# Patient Record
Sex: FEMALE | Race: WHITE | NOT HISPANIC OR LATINO | Employment: FULL TIME
[De-identification: names, ages, dates, MRNs, and addresses within clinical notes are randomized per-mention and may not be internally consistent; named-entity substitution may affect disease eponyms.]

---

## 2017-06-29 ENCOUNTER — RECORDS - HEALTHEAST (OUTPATIENT)
Dept: ADMINISTRATIVE | Facility: OTHER | Age: 22
End: 2017-06-29

## 2017-07-31 ENCOUNTER — RECORDS - HEALTHEAST (OUTPATIENT)
Dept: ADMINISTRATIVE | Facility: OTHER | Age: 22
End: 2017-07-31

## 2017-09-01 ENCOUNTER — RECORDS - HEALTHEAST (OUTPATIENT)
Dept: ADMINISTRATIVE | Facility: OTHER | Age: 22
End: 2017-09-01

## 2017-09-07 ENCOUNTER — TRANSFERRED RECORDS (OUTPATIENT)
Dept: HEALTH INFORMATION MANAGEMENT | Facility: CLINIC | Age: 22
End: 2017-09-07

## 2018-10-01 ENCOUNTER — TRANSFERRED RECORDS (OUTPATIENT)
Dept: HEALTH INFORMATION MANAGEMENT | Facility: CLINIC | Age: 23
End: 2018-10-01

## 2018-11-14 ENCOUNTER — OFFICE VISIT (OUTPATIENT)
Dept: DERMATOLOGY | Facility: CLINIC | Age: 23
End: 2018-11-14
Payer: COMMERCIAL

## 2018-11-14 DIAGNOSIS — L70.0 ACNE VULGARIS: ICD-10-CM

## 2018-11-14 DIAGNOSIS — L20.84 INTRINSIC ECZEMA: Primary | ICD-10-CM

## 2018-11-14 RX ORDER — TRIAMCINOLONE ACETONIDE 1 MG/G
CREAM TOPICAL 2 TIMES DAILY
Qty: 80 G | Refills: 1 | Status: SHIPPED | OUTPATIENT
Start: 2018-11-14 | End: 2020-07-15

## 2018-11-14 RX ORDER — ADAPALENE GEL USP, 0.3% 3 MG/G
GEL TOPICAL AT BEDTIME
COMMUNITY
End: 2020-07-15

## 2018-11-14 ASSESSMENT — PAIN SCALES - GENERAL: PAINLEVEL: NO PAIN (0)

## 2018-11-14 NOTE — LETTER
11/14/2018       RE: Reanna Diallo  [address] [place] [address]     Dear Colleague,    Thank you for referring your patient, Reanna Diallo, to the [facility] DERMATOLOGY at [facility]. Please see a copy of my visit note below.    [facility] Dermatology Note      Dermatology Problem List:  1. Acne vulgaris   - spironolactone 50 mg BID   - s/p adapalene 0.3% gel, doxycycline, spironolactone, Accutane   2. Eczematous dermatitis, neck and left axilla   - triamcinolone 0.1% ointment  3. Hx folliculitis, legs      Encounter Date: Nov 14, 2018    CC:  Chief Complaint   Patient presents with     Derm Problem     Reanna would like to discuss acne treatment .     Derm Problem     Reanna notes she has developed rashes after being accutane.      History of Present Illness:  Ms. Reanna Diallo is a 23 year old female who presents today in self referral for an acne and dermatitis evaluation as a new patient. There is no family history of acne, skin cancer or skin diseases. She admits to frequent sun exposure over her lifetime, as she enjoys the outdoors.     Today she reports that her acne is currently out of control. She has struggled with controlling her acne since she was in high school. She has tried multiple different topicals, doxycyline, spironolactone and one round of Accutane without much improvement. She was very unhappy with her course of Accutane in September 2017. She reports that she started experiencing rashes on her legs and stomach upset at the high dose of Accutane. Since she completed her course of Accutane, she noticed her acne coming back. She recently switched birth controls this past July to her third variety of OCP. She feels like her acne has worsened since her most recent change in birth controls. She is also unhappy with her acne scarring. About a month ago she started spironolactone and adapalene 0.3% gel.     The rashes on her  legs are still present. She finds that her legs are now so sensitive that it affects the types of pants she can wear. She describes the rash on her neck, arms and legs as small red dots. Her most recent flare of her rash was about a week ago while she wore a certain pair of jeans while she was on vacation. This rash has since resolved itself. About a year ago she was prescribed an oral antibiotic for this rash, which she was told was folliculitis. here is a different type of rash on her neck and arm pits than on her legs. She denies involvement of her chest and torso. She uses dove bar soap. She uses baby sensitive-skin laundry detergent.     She admits to a history of irregular periods piror to starting birth control. She denies excess facial hair. Otherwise the patient reports no additional painful, bleeding, nonhealing or pruritic lesions and denies any new or changing moles.     Past Medical History:   There is no problem list on file for this patient.    History reviewed. No pertinent past medical history.  History reviewed. No pertinent surgical history.    Social History:  Patient  reports that she has never smoked. She has never used smokeless tobacco.   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 80 g 1       Allergies   Allergen Reactions     Amoxicillin Hives     Penicillin G Hives       Review of Systems:  -Constitutional: Otherwise feeling well today, in usual state of  health.  -HEENT: Patient denies nonhealing oral sores.  -Skin: As above in HPI. No additional skin concerns.    Physical exam:  Vitals: There were no vitals taken for this visit.  GEN: This is a well developed, well-nourished female in no acute distress, in a pleasant mood.    SKIN: A focused examination of the face,neck, axillae and lower extremities. Significant for:     - Scattered inflammatory papules to lower face and lateral cheeks  - hyperemic papules noted and acne scarring noted to the lower face.   - No active eruption on legs  - pink eczematous plaques on anterior neck and left lateral chest   -No other lesions of concern on areas examined.       Impression/Plan:  1. Acne vulgaris, recalcitrant to topicals and oral antibiotics per pt history     Discussed birth controls and their relationship with acne. Recommend follow up with OB GYN regarding a vaginal ultrasound for PCOS    Continue spironolactone 100 mg daily. Reminded of side effects of spironolactone discussed including postural hypotension, increased frequency of urination, breast tenderness, menstrual spotting, cardiac arrythmias     Continue Adapalene 0.3% gel at bedtime.   2. Eczematous dermatitis  - Scrapings were obtained from the neck and left arm pit. Negative for fungal elements.     Recommend using triamcinolone 0.1% cream bid on these areas if symptomatic , up to 2 weeks.    Recommend diligent use of moisturizers     3. Hx folliculitis on legs per pt photos/history      No active eruption upon physical examination today. Return to clinic for culture if symptoms return    Recommend starting a BPO wash 2-3 times weekly while showering if lesions return    Follow-up in 3 months, earlier for new or changing lesions.     Staff Involved:  Scribe/Staff    Scribe Disclosure:   Alisha DOMINIQUE, am serving as a scribe to document services personally performed by Padmaja Acuna PA-C, based on data collection and the provider's statements to  me.    Provider Disclosure:   The documentation recorded by the scribe accurately reflects the services I personally performed and the decisions made by me.    All risks, benefits and alternatives were discussed with patient.  Patient is in agreement and understands the assessment and plan.  All questions were answered.  Sun Screen Education was given.   Return to Clinic in 3 months or sooner as needed.   Padmaja Acuna PA-C   [facility]       Again, thank you for allowing me to participate in the care of your patient.      Sincerely,    Padmaja Acuna PA-C

## 2018-11-14 NOTE — PROGRESS NOTES
[facility] Dermatology Note      Dermatology Problem List:  1. Acne vulgaris   - spironolactone 50 mg BID   - s/p adapalene 0.3% gel, doxycycline, spironolactone, Accutane   2. Eczematous dermatitis, neck and left axilla   - triamcinolone 0.1% ointment  3. Hx folliculitis, legs      Encounter Date: Nov 14, 2018    CC:  Chief Complaint   Patient presents with     Derm Problem     Reanna would like to discuss acne treatment .     Derm Problem     Reanna notes she has developed rashes after being accutane.      History of Present Illness:  Ms. Reanna Diallo is a 23 year old female who presents today in self referral for an acne and dermatitis evaluation as a new patient. There is no family history of acne, skin cancer or skin diseases. She admits to frequent sun exposure over her lifetime, as she enjoys the outdoors.     Today she reports that her acne is currently out of control. She has struggled with controlling her acne since she was in high school. She has tried multiple different topicals, doxycyline, spironolactone and one round of Accutane without much improvement. She was very unhappy with her course of Accutane in September 2017. She reports that she started experiencing rashes on her legs and stomach upset at the high dose of Accutane. Since she completed her course of Accutane, she noticed her acne coming back. She recently switched birth controls this past July to her third variety of OCP. She feels like her acne has worsened since her most recent change in birth controls. She is also unhappy with her acne scarring. About a month ago she started spironolactone and adapalene 0.3% gel.     The rashes on her legs are still present. She finds that her legs are now so sensitive that it affects the types of pants she can wear. She describes the rash on her neck, arms and legs as small red dots. Her most recent flare of her rash was about a week ago while she wore a certain pair of jeans  while she was on vacation. This rash has since resolved itself. About a year ago she was prescribed an oral antibiotic for this rash, which she was told was folliculitis. here is a different type of rash on her neck and arm pits than on her legs. She denies involvement of her chest and torso. She uses dove bar soap. She uses baby sensitive-skin laundry detergent.     She admits to a history of irregular periods piror to starting birth control. She denies excess facial hair. Otherwise the patient reports no additional painful, bleeding, nonhealing or pruritic lesions and denies any new or changing moles.     Past Medical History:   There is no problem list on file for this patient.    History reviewed. No pertinent past medical history.  History reviewed. No pertinent surgical history.    Social History:  Patient  reports that she has never smoked. She has never used smokeless tobacco.   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 80 g 1       Allergies   Allergen Reactions     Amoxicillin Hives     Penicillin G Hives       Review of Systems:  -Constitutional: Otherwise feeling well today, in usual state of health.  -HEENT: Patient denies nonhealing oral sores.  -Skin: As above in HPI. No additional skin concerns.    Physical exam:  Vitals: There were no vitals taken for this visit.  GEN: This is a well developed, well-nourished female in no acute distress, in a pleasant mood.     SKIN: A focused examination of the face,neck, axillae and lower extremities. Significant for:     - Scattered inflammatory papules to lower face and lateral cheeks  - hyperemic papules noted and acne scarring noted to the lower face.   - No active eruption on legs  - pink eczematous plaques on anterior neck and left lateral chest   -No other lesions of concern on areas examined.       Impression/Plan:  1. Acne vulgaris, recalcitrant to topicals and oral antibiotics per pt history     Discussed birth controls and their relationship with acne. Recommend follow up with OB GYN regarding a vaginal ultrasound for PCOS    Continue spironolactone 100 mg daily. Reminded of side effects of spironolactone discussed including postural hypotension, increased frequency of urination, breast tenderness, menstrual spotting, cardiac arrythmias     Continue Adapalene 0.3% gel at bedtime.   2. Eczematous dermatitis  - Scrapings were obtained from the neck and left arm pit. Negative for fungal elements.     Recommend using triamcinolone 0.1% cream bid on these areas if symptomatic , up to 2 weeks.    Recommend diligent use of moisturizers     3. Hx folliculitis on legs per pt photos/history      No active eruption upon physical examination today. Return to clinic for culture if symptoms return    Recommend starting a BPO wash 2-3 times weekly while showering if lesions return    Follow-up in 3 months, earlier for new or changing lesions.     Staff Involved:  Scribe/Staff    Scribe Disclosure:   TRIP, Alisha Anderson, am serving as a scribe to document services personally performed by Padmaja Acuna PA-C, based on data collection and the provider's statements to me.    Provider Disclosure:   The documentation recorded by the scribe accurately reflects the services I personally performed and the decisions made by me.    All risks, benefits and alternatives were discussed with patient.  Patient is in agreement and understands the assessment  and plan.  All questions were answered.  Sun Screen Education was given.   Return to Clinic in 3 months or sooner as needed.   Padmaja Acuna PA-C   [facility]

## 2018-11-14 NOTE — NURSING NOTE
Dermatology Rooming Note    Reanna Diallo's goals for this visit include:   Chief Complaint   Patient presents with     Derm Problem     Reanna would like to discuss acne treatment .     Derm Problem     Reanna notes she has developed rashes after being accutane.      Grace Jack LPN

## 2018-11-14 NOTE — LETTER
Date:November 15, 2018      Patient was self referred, no letter generated. Do not send.        [facility] Health Information

## 2018-11-14 NOTE — MR AVS SNAPSHOT
After Visit Summary   2018    Reanna Diallo    MRN: [id number]           Patient Information     Date Of Birth          1995        Visit Information        Provider Department      2018 3:30 PM Padmaja Acuna PA-C [facility] Dermatology        Today's Diagnoses     Intrinsic eczema    -  1    Acne vulgaris           Follow-ups after your visit        Follow-up notes from your care team     Return in about 3 months (around 2019).      Your next 10 appointments already scheduled     2019  6:15 PM CST   (Arrive by 6:00 PM)   Return Visit with BEENA Campos [facility] Dermat[facility] ([facility])    [address] [place] [id number] [phone]              Who to contact     Please call your clinic at [phone] to:    Ask questions about your health    Make or cancel appointments    Discuss your medicines    Learn about your test results    Speak to your doctor            Additional Information About Your Visit        MyChart Information     [employer] is an electronic gateway that provides easy, online access to your medical records. With [employer], you can request a clinic appointment, read your test results, renew a prescription or communicate with your care team.     To sign up for [employer] visit the website at [email or web address]   You will be asked to enter the access code listed below, as well as some personal information. Please follow the directions to create your username and password.     Your access code is: [email or web address]  Expires: 2019  6:31 AM     Your access code will  in 90 days. If you need help or a new code, please contact your [facility] or call [phone] for assistance.        Care EveryWhere ID     This is your Care EveryWhere ID. This could be used by other organizations to access your [place] medical records  [id number]          Blood Pressure from Last 3 Encounters:   No data found for BP    Weight from Last 3 Encounters:   No data found for Wt              We Performed the Following     Koh prep (Back Office)          Today's Medication Changes          These changes are accurate as of 11/14/18  8:32 PM.  If you have any questions, ask your nurse or doctor.               Start taking these medicines.        Dose/Directions    triamcinolone 0.1 % cream   Commonly known as:  KENALOG   Used for:  Intrinsic eczema   Started by:  Padmaja Acuna PA-C        Apply topically 2 times daily Up to 2 weeks to affected areas on the neck and lateral chest/ axilla.   Quantity:  80 g   Refills:  1            Where to get your medicines      These medications were sent to [id number] [place], [place] - [address]  [address] [facility] [address]     Phone:  [phone]     triamcinolone 0.1 % cream                Primary Care Provider    None Specified       No primary provider on file.        Equal Access to Services     [facility]: Hadii mohit Stoll, waaxparker mojica, qaybta kaalmada benja, bijal sanz . So [facility] [phone].    ATENCIÓN: Si habla español, tiene a israel disposición servicios gratuitos de asistencia lingüística. Llame al [phone].    We comply with applicable federal civil rights laws and [place] laws. We do not discriminate on the basis of race, color, national origin, age, disability, sex, sexual orientation, or gender identity.            Thank you!     Thank you for choosing [facility] DERMATOLOGY  for your care. Our goal is always to provide you with excellent care. Hearing back from our patients is one way we can continue to improve our services. Please take a few minutes to complete the written survey that you may receive in the mail after your visit with us. Thank you!             Your Updated Medication List - Protect others around you: Learn how to  safely use, store and throw away your medicines at www.disposemymeds.org.          This list is accurate as of 11/14/18  8:32 PM.  Always use your most recent med list.                   Brand Name Dispense Instructions for use Diagnosis    adapalene 0.3 % gel      Apply topically At Bedtime        norgestrel-ethinyl estradiol 0.3-30 MG-MCG per tablet    LO/OVRAL     Take 1 tablet by mouth daily        SPIRONOLACTONE PO      Take 100 mg by mouth        triamcinolone 0.1 % cream    KENALOG    80 g    Apply topically 2 times daily Up to 2 weeks to affected areas on the neck and lateral chest/ axilla.    Intrinsic eczema

## 2019-02-13 ENCOUNTER — OFFICE VISIT (OUTPATIENT)
Dept: DERMATOLOGY | Facility: CLINIC | Age: 24
End: 2019-02-13
Payer: COMMERCIAL

## 2019-02-13 VITALS — SYSTOLIC BLOOD PRESSURE: 113 MMHG | HEART RATE: 78 BPM | DIASTOLIC BLOOD PRESSURE: 70 MMHG

## 2019-02-13 DIAGNOSIS — L70.0 ACNE VULGARIS: Primary | ICD-10-CM

## 2019-02-13 RX ORDER — CLINDAMYCIN PHOSPHATE 10 MG/G
GEL TOPICAL DAILY
Qty: 60 G | Refills: 11 | Status: SHIPPED | OUTPATIENT
Start: 2019-02-13 | End: 2020-07-15

## 2019-02-13 RX ORDER — SPIRONOLACTONE 50 MG/1
150 TABLET, FILM COATED ORAL DAILY
Qty: 90 TABLET | Refills: 2 | Status: SHIPPED | OUTPATIENT
Start: 2019-02-13 | End: 2019-05-22

## 2019-02-13 ASSESSMENT — PAIN SCALES - GENERAL: PAINLEVEL: NO PAIN (0)

## 2019-02-13 NOTE — PROGRESS NOTES
[facility] Dermatology Note      Dermatology Problem List:  1. Acne vulgaris   - spironolactone 50 mg TID, clindamycin 1% gel  - s/p adapalene 0.3% gel, doxycycline, spironolactone, Accutane   2. Eczematous dermatitis, neck and left axilla   - triamcinolone 0.1% ointment  3. Hx folliculitis, legs      Encounter Date: Feb 13, 2019    CC:  Chief Complaint   Patient presents with     Acne     Reanna is here today for a 3 month follow up acne follow up. No change      History of Present Illness:  Ms. Reanna Diallo is a 23 year old female who presents today in follow up for acne. The patient was last seen in the dermatology clinic on 11/14/18 during which she was recommended BPO wash for her folliculitis.     Today she reports her skin is stable and has not changed recently. She feels her skin has plateaued. She has been taking her spironolactone daily, but has reduced use of her topicals as they are too irritating for her skin. Her acne is still isolated to her face. She notes that every month around her period she gets deep, painful cystic papules.     Otherwise she is feeling well, without additional skin concerns. Denies menstrual irregularities, excessive urination or dizziness. She is still using OCP for birth control.    Past Medical History:   There is no problem list on file for this patient.    No past medical history on file.  No past surgical history on file.    Social History:  Patient  reports that  has never smoked. she has never used smokeless tobacco.   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 80 g 1       Allergies   Allergen Reactions     Amoxicillin Hives     Penicillin G Hives       Review of Systems:  -Constitutional: Otherwise feeling well today, in usual state of health.  -HEENT: Patient denies nonhealing oral sores.  -Skin: As above in HPI. No additional skin concerns.    Physical exam:  Vitals: There were no vitals taken for this visit.  GEN: This is a well developed, well-nourished female in no acute distress, in a pleasant mood.    SKIN: A focused examination of the face,neck, axillae and lower extremities. Significant for:     - Scattered inflammatory papules to lower face and lateral cheeks  - hyperemic papules noted and acne scarring noted to the lower face.   -No other lesions of concern on areas examined.       Impression/Plan:  1. Acne vulgaris, recalcitrant to topicals and oral antibiotics per pt history (doxycyline, topical sulfacetamide wash, topical erythromycin and tretinoin)    Discussed risks and benefits of Accutane at length. Pt wishes to wait on initiating a second course of Accutane. Will consider at follow up visit.     Increase dose of spironolactone to 150 mg daily. Reminded of side effects of spironolactone discussed including postural hypotension, increased frequency of urination, breast tenderness, menstrual spotting, cardiac arrythmias     Restart Adapalene 0.3% gel every 2-3rd night. Discussed increasing use to every other night or daily as tolerated.    Start clindamycin 1% gel, apply daily to face     Photodocumentation obtained today     Follow-up in 3 months, earlier for new or changing lesions.     Staff Involved:  Scribe/Staff    Scribe Disclosure:   Alisha DOMINIQUE, am serving as a scribe to document services personally performed by Padmaja Acuna PA-C, based on data collection and the provider's statements to  me.    Provider Disclosure:   The documentation recorded by the scribe accurately reflects the services I personally performed and the decisions made by me.    All risks, benefits and alternatives were discussed with patient.  Patient is in agreement and understands the assessment and plan.  All questions were answered.  Sun Screen Education was given.   Return to Clinic in 3 months or sooner as needed.   Padmaja Acuna PA-C   [facility]

## 2019-02-13 NOTE — LETTER
Date:February 14, 2019      Patient was self referred, no letter generated. Do not send.        [facility] Health Information

## 2019-02-13 NOTE — NURSING NOTE
Dermatology Rooming Note    Reanna Diallo's goals for this visit include:   Chief Complaint   Patient presents with     Acne     Reanna is here today for a 3 month follow up acne follow up. No change      HOMER Dickens

## 2019-02-13 NOTE — LETTER
2/13/2019       RE: Renana Diallo  [address] [place] [address]     Dear Colleague,    Thank you for referring your patient, Reanna Diallo, to the [facility] DERMATOLOGY at [facility]. Please see a copy of my visit note below.    [facility] Dermatology Note      Dermatology Problem List:  1. Acne vulgaris   - spironolactone 50 mg TID, clindamycin 1% gel  - s/p adapalene 0.3% gel, doxycycline, spironolactone, Accutane   2. Eczematous dermatitis, neck and left axilla   - triamcinolone 0.1% ointment  3. Hx folliculitis, legs      Encounter Date: Feb 13, 2019    CC:  Chief Complaint   Patient presents with     Acne     Reanna is here today for a 3 month follow up acne follow up. No change      History of Present Illness:  Ms. Reanna Diallo is a 23 year old female who presents today in follow up for acne. The patient was last seen in the dermatology clinic on 11/14/18 during which she was recommended BPO wash for her folliculitis.     Today she reports her skin is stable and has not changed recently. She feels her skin has plateaued. She has been taking her spironolactone daily, but has reduced use of her topicals as they are too irritating for her skin. Her acne is still isolated to her face. She notes that every month around her period she gets deep, painful cystic papules.     Otherwise she is feeling well, without additional skin concerns. Denies menstrual irregularities, excessive urination or dizziness. She is still using OCP for birth control.    Past Medical History:   There is no problem list on file for this patient.    No past medical history on file.  No past surgical history on file.    Social History:  Patient  reports that  has never smoked. she has never used smokeless tobacco.   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 80 g 1       Allergies   Allergen Reactions     Amoxicillin Hives     Penicillin G Hives       Review of Systems:  -Constitutional: Otherwise feeling well today, in usual state of health.  -HEENT: Patient denies nonhealing oral sores.  -Skin: As above in HPI. No additional skin concerns.    Physical exam:  Vitals: There were no vitals taken for this visit.  GEN: This is a well developed, well-nourished female in no acute distress, in a pleasant mood.    SKIN: A focused examination of the face,neck, axillae and lower extremities. Significant for:     - Scattered inflammatory papules to lower face and lateral cheeks  - hyperemic papules noted and acne scarring noted to the lower face.   -No other lesions of concern on areas examined.       Impression/Plan:  1. Acne vulgaris, recalcitrant to topicals and oral antibiotics per pt history (doxycyline, topical sulfacetamide wash, topical erythromycin and tretinoin)    Discussed risks and benefits of Accutane at length. Pt wishes to wait on initiating a second course of Accutane. Will consider at follow up visit.     Increase dose of spironolactone to 150 mg daily. Reminded of side effects of spironolactone discussed including postural hypotension, increased frequency of urination, breast tenderness, menstrual spotting, cardiac arrythmias     Restart Adapalene 0.3% gel every 2-3rd night. Discussed increasing use to every other night or daily as tolerated.    Start clindamycin 1% gel, apply daily to face     Photodocumentation obtained today      Follow-up in 3 months, earlier for new or changing lesions.     Staff Involved:  Scribe/Staff    Scribe Disclosure:   I, Alisha Anderson, am serving as a scribe to document services personally performed by Padmaja Acuna PA-C, based on data collection and the provider's statements to me.    Provider Disclosure:   The documentation recorded by the scribe accurately reflects the services I personally performed and the decisions made by me.    All risks, benefits and alternatives were discussed with patient.  Patient is in agreement and understands the assessment and plan.  All questions were answered.  Sun Screen Education was given.   Return to Clinic in 3 months or sooner as needed.   Padmaja Acuna PA-C   [facility]       Again, thank you for allowing me to participate in the care of your patient.      Sincerely,    Padmaja Acuna PA-C

## 2019-05-22 ENCOUNTER — OFFICE VISIT (OUTPATIENT)
Dept: DERMATOLOGY | Facility: CLINIC | Age: 24
End: 2019-05-22
Payer: COMMERCIAL

## 2019-05-22 VITALS — WEIGHT: 151 LBS | DIASTOLIC BLOOD PRESSURE: 71 MMHG | HEART RATE: 56 BPM | SYSTOLIC BLOOD PRESSURE: 128 MMHG

## 2019-05-22 DIAGNOSIS — L70.0 ACNE VULGARIS: Primary | ICD-10-CM

## 2019-05-22 DIAGNOSIS — L70.0 ACNE VULGARIS: ICD-10-CM

## 2019-05-22 LAB — HCG UR QL: NEGATIVE

## 2019-05-22 RX ORDER — SPIRONOLACTONE 50 MG/1
150 TABLET, FILM COATED ORAL DAILY
Qty: 90 TABLET | Refills: 1 | Status: SHIPPED | OUTPATIENT
Start: 2019-05-22 | End: 2020-07-15

## 2019-05-22 ASSESSMENT — PAIN SCALES - GENERAL: PAINLEVEL: NO PAIN (0)

## 2019-05-22 NOTE — NURSING NOTE
Dermatology Rooming Note    Reanna Diallo's goals for this visit include:   Chief Complaint   Patient presents with     Derm Problem     Acne, no improvement noted.     Grace Jack LPN

## 2019-05-22 NOTE — LETTER
5/22/2019       RE: Reanna Diallo  [address] [place] [address]     Dear Colleague,    Thank you for referring your patient, Reanna Diallo, to the [facility] DERMATOLOGY at [facility]. Please see a copy of my visit note below.    [facility] Dermatology Note      Dermatology Problem List:  1. Acne vulgaris   - spironolactone 150 mg daily, clindamycin 1% gel  - s/p adapalene 0.3% gel, doxycycline, spironolactone 100 mg daily, Accutane course   2. Eczematous dermatitis, neck and left axilla   - triamcinolone 0.1% ointment  3. Hx folliculitis, legs  - BPO wash    Encounter Date: May 22, 2019    CC:  Chief Complaint   Patient presents with     Derm Problem     Acne, no improvement noted.     History of Present Illness:  Ms. Reanna Diallo is a 23 year old female who presents today in follow up for acne. The patient was last seen in the dermatology clinic on  2/12/2019 during which her spironolactone was increased to 150 mg. She was restarted on her adapalene 0.3% gel nightly and was started on clindamycin 1% gel daily. She states her skin is slightly better but is still having breakouts.  Her acne is still isolated to her face. She notes that every month around her period she gets deep, painful cystic papules. She is interested in other options today, including Accutane.     Otherwise she is feeling well, without additional skin concerns. Denies menstrual irregularities, excessive urination or dizziness. She is still using OCP for birth control.    Past Medical History:   There is no problem list on file for this patient.    No past medical history on file.  No past surgical history on file.    Social History:  Patient  reports that she has never smoked. She has never used smokeless tobacco.   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[employer]    Family History:  Family History   Problem Relation Age of Onset     Skin Cancer No family hx of      Melanoma No family  hx of        Medications:  Current Outpatient Medications   Medication Sig Dispense Refill     adapalene 0.3 % gel Apply topically At Bedtime       clindamycin (CLINDAMAX) 1 % external gel Apply topically daily 60 g 11     norgestrel-ethinyl estradiol (LO/OVRAL) 0.3-30 MG-MCG per tablet Take 1 tablet by mouth daily       spironolactone (ALDACTONE) 50 MG tablet Take 3 tablets (150 mg) by mouth daily 90 tablet 1     triamcinolone (KENALOG) 0.1 % cream Apply topically 2 times daily Up to 2 weeks to affected areas on the neck and lateral chest/ axilla. (Patient not taking: Reported on 5/22/2019) 80 g 1       Allergies   Allergen Reactions     Amoxicillin Hives     Penicillin G Hives       Review of Systems:  -Constitutional: Otherwise feeling well today, in usual state of health.  -HEENT: Patient denies nonhealing oral sores.  -Skin: As above in HPI. No additional skin concerns.    Physical exam:  Vitals: /71   Pulse 56   Wt 68.5 kg (151 lb)   GEN: This is a well developed, well-nourished female in no acute distress, in a pleasant mood.    SKIN: Acne exam, which includes the face, neck, upper central chest, and upper central back was performed.   Significant for:   - Scattered inflammatory papules to lower face and lateral cheeks  - hyperemic papules noted and acne scarring noted to the lower face.   -No other lesions of concern on areas examined.       Impression/Plan:  1. Acne vulgaris, recalcitrant to topicals and oral antibiotics per pt history (doxycyline, topical sulfacetamide wash, topical erythromycin and tretinoin)    Discussed risks and benefits of Accutane at length. Will initiate Accutane process today and continue the oral and topical medications until next month.     Continue spironolactone to 150 mg daily. Reminded of side effects of spironolactone discussed including postural hypotension, increased frequency of urination, breast tenderness, menstrual spotting, cardiac arrythmias     Continue   Adapalene 0.3% gel every 2-3rd night. Discussed increasing use to every other night or daily as tolerated.    Continue clindamycin 1% gel, apply daily to face     Discussion of the risks and side effects of Accutane including but not limited to mucocutaneous dryness, arthralgias, myalgias, depression, suicidal ideation, headache, blurred vision,  increase in liver function tests, increase in lipids, and teratogenic effects. Discussed need for two forms of contraception. The ipledgeprogram brochure was provided and the contents discussed with the patient. The patient was counseled they cannot give blood while on Accutane. No personal or family history of inflammatory bowel disease or hypertriglyceridemia known to patient. Reviewed need to avoid alcohol on medication.   Ipledge program consent was obtained. Patient counseled that if they wear contacts eye may become too dry to tolerate. Recommend follow up with eye doctor.    At the next visit, we will begin Accutane 40 mg daily. Goal dose is 10,295 to 15,100 mg 150-220 mg/kg dosing in this 68 kg patient.    Baseline labs including CBC, BUN/Cr, fasting lipids and alt/ast will be obtained at next visit.     Baseline pregnancy test today. The patients two forms of contraception are OCP and male latex condoms.     Total cumulative dose 0 mg.   Patient's I-pledge # is [phone]  The patient will stop all acne medications   Follow-up in 1 months, earlier for new or changing lesions.     All risks, benefits and alternatives were discussed with patient.  Patient is in agreement and understands the assessment and plan.  All questions were answered.  Sun Screen Education was given.   Return to Clinic in 1 months or sooner as needed.   Padmaja Acuna PA-C   [facility]

## 2019-05-22 NOTE — PROGRESS NOTES
[facility] Dermatology Note      Dermatology Problem List:  1. Acne vulgaris   - spironolactone 150 mg daily, clindamycin 1% gel  - s/p adapalene 0.3% gel, doxycycline, spironolactone 100 mg daily, Accutane course   2. Eczematous dermatitis, neck and left axilla   - triamcinolone 0.1% ointment  3. Hx folliculitis, legs  - BPO wash    Encounter Date: May 22, 2019    CC:  Chief Complaint   Patient presents with     Derm Problem     Acne, no improvement noted.     History of Present Illness:  Ms. Reanna Diallo is a 23 year old female who presents today in follow up for acne. The patient was last seen in the dermatology clinic on  2/12/2019 during which her spironolactone was increased to 150 mg. She was restarted on her adapalene 0.3% gel nightly and was started on clindamycin 1% gel daily. She states her skin is slightly better but is still having breakouts.  Her acne is still isolated to her face. She notes that every month around her period she gets deep, painful cystic papules. She is interested in other options today, including Accutane.     Otherwise she is feeling well, without additional skin concerns. Denies menstrual irregularities, excessive urination or dizziness. She is still using OCP for birth control.    Past Medical History:   There is no problem list on file for this patient.    No past medical history on file.  No past surgical history on file.    Social History:  Patient  reports that she has never smoked. She has never used smokeless tobacco.   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[employer]    Family History:  Family History   Problem Relation Age of Onset     Skin Cancer No family hx of      Melanoma No family hx of        Medications:  Current Outpatient Medications   Medication Sig Dispense Refill     adapalene 0.3 % gel Apply topically At Bedtime       clindamycin (CLINDAMAX) 1 % external gel Apply topically daily 60 g 11     norgestrel-ethinyl estradiol (LO/OVRAL) 0.3-30 MG-MCG per  tablet Take 1 tablet by mouth daily       spironolactone (ALDACTONE) 50 MG tablet Take 3 tablets (150 mg) by mouth daily 90 tablet 1     triamcinolone (KENALOG) 0.1 % cream Apply topically 2 times daily Up to 2 weeks to affected areas on the neck and lateral chest/ axilla. (Patient not taking: Reported on 5/22/2019) 80 g 1       Allergies   Allergen Reactions     Amoxicillin Hives     Penicillin G Hives       Review of Systems:  -Constitutional: Otherwise feeling well today, in usual state of health.  -HEENT: Patient denies nonhealing oral sores.  -Skin: As above in HPI. No additional skin concerns.    Physical exam:  Vitals: /71   Pulse 56   Wt 68.5 kg (151 lb)   GEN: This is a well developed, well-nourished female in no acute distress, in a pleasant mood.    SKIN: Acne exam, which includes the face, neck, upper central chest, and upper central back was performed.   Significant for:   - Scattered inflammatory papules to lower face and lateral cheeks  - hyperemic papules noted and acne scarring noted to the lower face.   -No other lesions of concern on areas examined.       Impression/Plan:  1. Acne vulgaris, recalcitrant to topicals and oral antibiotics per pt history (doxycyline, topical sulfacetamide wash, topical erythromycin and tretinoin)    Discussed risks and benefits of Accutane at length. Will initiate Accutane process today and continue the oral and topical medications until next month.     Continue spironolactone to 150 mg daily. Reminded of side effects of spironolactone discussed including postural hypotension, increased frequency of urination, breast tenderness, menstrual spotting, cardiac arrythmias     Continue  Adapalene 0.3% gel every 2-3rd night. Discussed increasing use to every other night or daily as tolerated.    Continue clindamycin 1% gel, apply daily to face     Discussion of the risks and side effects of Accutane including but not limited to mucocutaneous dryness, arthralgias,  myalgias, depression, suicidal ideation, headache, blurred vision,  increase in liver function tests, increase in lipids, and teratogenic effects. Discussed need for two forms of contraception. The ipledgeprogram brochure was provided and the contents discussed with the patient. The patient was counseled they cannot give blood while on Accutane. No personal or family history of inflammatory bowel disease or hypertriglyceridemia known to patient. Reviewed need to avoid alcohol on medication.   Ipledge program consent was obtained. Patient counseled that if they wear contacts eye may become too dry to tolerate. Recommend follow up with eye doctor.    At the next visit, we will begin Accutane 40 mg daily. Goal dose is 10,295 to 15,100 mg 150-220 mg/kg dosing in this 68 kg patient.    Baseline labs including CBC, BUN/Cr, fasting lipids and alt/ast will be obtained at next visit.     Baseline pregnancy test today. The patients two forms of contraception are OCP and male latex condoms.     Total cumulative dose 0 mg.   Patient's I-pledge # is [phone]  The patient will stop all acne medications   Follow-up in 1 months, earlier for new or changing lesions.     All risks, benefits and alternatives were discussed with patient.  Patient is in agreement and understands the assessment and plan.  All questions were answered.  Sun Screen Education was given.   Return to Clinic in 1 months or sooner as needed.   Padmaja Acuna PA-C   [facility]

## 2020-03-10 ENCOUNTER — HEALTH MAINTENANCE LETTER (OUTPATIENT)
Age: 25
End: 2020-03-10

## 2020-07-15 ENCOUNTER — VIRTUAL VISIT (OUTPATIENT)
Dept: DERMATOLOGY | Facility: CLINIC | Age: 25
End: 2020-07-15
Payer: COMMERCIAL

## 2020-07-15 DIAGNOSIS — L70.0 ACNE VULGARIS: Primary | ICD-10-CM

## 2020-07-15 DIAGNOSIS — D48.5 NEOPLASM OF UNCERTAIN BEHAVIOR OF SKIN: ICD-10-CM

## 2020-07-15 PROCEDURE — 99212 OFFICE O/P EST SF 10 MIN: CPT | Mod: 95 | Performed by: PHYSICIAN ASSISTANT

## 2020-07-15 NOTE — PROGRESS NOTES
[facility]:  Video: ( Invitation sent by:  [employer] and text to cell phone )    Impression and Recommendations (Patient Counseled on the Following):  1. Acne Vulgaris  -Continue OCPs, this is working well for her  -previously discontinued spironolactone and clindamycin    2. NUB - scalp -ddx: MM vs atypical nevus vs Spitz nevus vs benign nevus vs other  -The clinical differential includes a malignancy diagnosis. Therefore, in-person appointment for possibly biopsy is recommended.   -Will plan for FBSE at that time as well    Follow-up:   Follow-up with dermatology in approximately 2 weeks. Earlier for new or changing lesions or rash.   CC Dr. Berg on close of this encounter.     Staff only:    All risks, benefits and alternatives were discussed with patient.  Patient is in agreement and understands the assessment and plan.  All questions were answered.    Nicolette Quiles PA-C, MPAS  [facility]: Phone: [phone], Fax: [phone]  [facility]: Phone: [phone],  Fax: [phone]  _____________________________________________________________________________    Dermatology Problem List:  0. NUB - scalp - ddx: MM vs atypical nevus vs Spitz nevus vs benign nevus vs other  -planning biopsy at next visit  1. Acne vulgaris   - spironolactone 150 mg daily, clindamycin 1% gel  - s/p adapalene 0.3% gel, doxycycline, spironolactone 100 mg daily, Accutane course   2. Eczematous dermatitis, neck and left axilla   - triamcinolone 0.1% ointment  3. Hx folliculitis, legs  - BPO wash    Encounter Date: Jul 15, 2020    CC:   No chief complaint on file.      History of Present Illness:  I have reviewed the teledermatology information and the nursing intake corresponding to this issue. Reanna Diallo is a 25 year old female who presents via teledermatology for a mole of concern as well as an acne follow-up. She  "switched birth control pills about 1 year ago and her skin has since cleared up. She also changed products to more natural things and acne have improved dramatically. She was previously on spironolactone 150mg daily as well as clindamycin 1% lotion for acne. She was last seen by Padmaja Aguero PA-C on 5/22/19. She is no longer taking either of these medications.  She is also here regarding a mole of concern on her scalp. Her hairdresser noticed it back in June. She says her hairdresser has never said anything about it before. So for that reason she thinks it relatively new, but Reanna is not sure because she cannot see it herself. She thinks it is smaller than a pencil eraser. It is not raised, it is not itchy or symptomactic.  She has no fhx of skin cancer.  No personal hx of skin cancer. She does not wear hats in the sun, she's not overly cautious with use of sunscreen. No tanning bed use in the past. She says that mole is really dark for her, the rest of her moles are not this dark. She says she has feckles on her face and chest but not a lot of dark moles. So for her this is atypically shaped and very dark and \"uncharicateristic\" for her. She otherwise is doing well and has no other concerns.     ROS: Patient is generally feeling well today   -Constitutional: no unintended weight loss/gain, no night sweats or fevers  -Skin: as per HPI    Physical Examination:  General: Well-appearing, appropriately-developed individual.  Skin: Focused examination within the teledermatology photograph(s) including scalp was performed.   ~4mm asymmetrical dark brown/black macule on the scalp    Past Medical History:   There is no problem list on file for this patient.    No past medical history on file.  No past surgical history on file.    Social History:  Patient reports that she has never smoked. She has never used smokeless tobacco.    Family History:  Family History   Problem Relation Age of Onset     Skin Cancer No family " "hx of      Melanoma No family hx of        Medications:  Current Outpatient Medications   Medication     adapalene 0.3 % gel     norgestrel-ethinyl estradiol (LO/OVRAL) 0.3-30 MG-MCG per tablet     spironolactone (ALDACTONE) 50 MG tablet     triamcinolone (KENALOG) 0.1 % cream     No current facility-administered medications for this visit.        Allergies   Allergen Reactions     Amoxicillin Hives     Penicillin G Hives   _____________________________________________________________________________    Teledermatology information:  - Location of patient: [place]  - Patient presented as: return  - Location of teledermatologist:  ([facility] )  - Reason teledermatology is appropriate:  of National Emergency Regarding Coronavirus disease (COVID 19) Outbreak  - Image quality and interpretability: acceptable  - Physician has received verbal consent for a Video/Photos Visit from the patient? Yes  - In-person dermatology visit recommendation: yes - for biopsy  - Date of images: 7/14/20  - Length of call: 9min  - Date of report: 7/15/2020     Teledermatology Nurse Call for RETURN patients seen within the last 3 years:    The patient was contacted by phone and we reviewed, \"Due to the coronavirus pandemic, we are calling to review your visit and offer you a teledermatology visit where you send in photos via [employer]. These photos will be seen by an MD or BEENA. This will be billed to you and your insurance.\"  The patient was also told that \"a teledermatology visit is not as thorough as an in-person visit and that the quality of the photograph sent may not be of the same quality as that taken by the dermatology clinic, but the patient would like to proceed with an teledermatology because of National Emergency Regarding Coronavirus disease (COVID 19) Outbreak.\"  \"If a prescription is necessary we can send it directly to your pharmacy.  If lab work is needed we can place an order for that and you can then " "stop by our lab to have the test done at a later time.\"    The patient understood that they may receive a call from the clinic to review additional history, may still be instructed to come to clinic even after photo review and be billed for both visits with an MD. Visits are billed at different rates depending on your insurance coverage. Please reach out to your insurance provider with any questions.They were told that a photo assessment does not replace an in person skin exam. The patient understood that teledermatology is not for urgent issues and would require up to 3 business days for review. The patient denied skin pain, fever, mucosal symptoms (lesions, blisters, sores in the mouth, nose, eyes, or genitals)  IF PATIENT ENDORSES ANY OF THESE STOP AND PAGE  ON CALL ATTENDING. IF OTHER POSSIBLY URGENT SYMPTOMS THEN PAGE PHYSICIAN YOU ARE SCHEDULING WITH OR ON CALL IF NO ANSWER.       The patient chose to:                                                                                                                                                                                                                    Consent to a teledermatology visit with mychart photos. The patient understood they must upload a mychart photo for this visit to be completed. They indicated that the photo will be taken at their home address (if other address please document here). Patient told nursing these are already uploaded .  The patient was instructed to take photos of all all areas of concern and all areas of any rash from near and far away.                                                                                                                                                                                                                               The patient is verified to be in the [facility] at the time of the encounter.                                                                                        "                                                                                                                     Patient concerns for this return visit: Mole on scalp. Cannot see it,  saw it in June but had not seen it prior to. Not itchy, scabby, or bleeding.     Photo instructions reviewed with patients as below:  -ALL patient needs to send photos unless they have a phone only visit approved by the clinic:  o To send photos to your doctor, respond to the message in [employer] as many times as needed to upload your photos. Each message allows for 3 photos.  o For spots or lesions of concern, please take at least 2 photos of each site you are worried about (at different angles if needed, and at least one close up and one farther away so we can tell where it is on the body. Be sure all photographs are in focus)  o For rashes, take photos of the entire body because it is important for us to see which areas are involved and which areas are not involved.  At a minimum, please include photos of the arms, legs, front of trunk, back of trunk, face, and a few close-ups of the rash.  Leave undergarments in place unless the rash involves the skin in these areas  o For acne, please take photos of the face, upper chest and neck, and upper chest and back  o For hair loss, please send views of the top of the head, sides of the head, back of the head and a picture of your face with your hair pulled back. Also, a photos of both hands with nails.    -For ADULT NEW patients video visits are needed, to receive an invitation and connect with your provider, the [employer] video visit technology must be accessible from your smartphone or personal device. Please click the link below for setup instructions: [employer]/videovisit    Nursing tasks completed  -Pharmacy preference was updated.  -The nurse has dropped in the AVS information *(For adults the phrase is umdermhteleavs and for pediatrics it is their own) for the physician  to route in the AVS.                                                                                                                                                                                                                         -The patient was told to contact the clinic if they have not received correspondence within 72 hours.       Sylwia Anglin LPN

## 2020-07-15 NOTE — PATIENT INSTRUCTIONS
[facility] Teledermatology Visit    Thank you for allowing us to participate in your care. Your findings, instructions and follow-up plan are as follows:    1. Mole on scalp - follow-up for in person skin check as well as a potential biopsy of the scalp lesion    When should I call my doctor?    If you are worsening or not improving, please, contact us or seek urgent care as noted below.     Who should I call with questions (adults)?    [facility] (adult and pediatric): [phone]     [facility] (adult): [phone]    For urgent needs outside of business hours call the [facility] at [phone] and ask for the dermatology resident on call    If this is a medical emergency and you are unable to reach an ER, Call [phone]      Who should I call with questions (pediatric)?  [facility]- Pediatric Dermatology  Dr. Michelle Houser, Dr. Blade Phillips, Dr. Jamee Farley, Padmaja Acuna, PA  Dr. Sierra Costello, Dr. Eloisa Babb & Dr. Shiraz Rivera  Non Urgent  Nurse Triage Line; [phone]- Sherry and Paulina RN Care Coordinators   Carina (/Complex ) [phone]    If you need a prescription refill, please contact your pharmacy. Refills are approved or denied by our Physicians during normal business hours, Monday through Fridays  Per office policy, refills will not be granted if you have not been seen within the past year (or sooner depending on your child's condition)    Scheduling Information:  Pediatric Appointment Scheduling and Call Center [phone]  Radiology Scheduling- [phone]  Sedation Unit Scheduling- [phone]  [place] Scheduling- General [phone]; Pediatric Dermatology [phone]  Main  Services: [phone]  Nigerian: [phone]  Andorran: [phone]  Hmong/Nelson/English: [phone]  Preadmission Nursing Department Fax Number: [phone] (Fax all pre-operative paperwork to this number)    For urgent matters arising during evenings, weekends, or holidays that cannot wait for normal business hours please call [phone] and ask for the Dermatology Resident On-Call to be paged.

## 2020-07-22 ENCOUNTER — OFFICE VISIT (OUTPATIENT)
Dept: DERMATOLOGY | Facility: CLINIC | Age: 25
End: 2020-07-22
Payer: COMMERCIAL

## 2020-07-22 DIAGNOSIS — D48.5 NEOPLASM OF UNCERTAIN BEHAVIOR OF SKIN: Primary | ICD-10-CM

## 2020-07-22 PROCEDURE — 88305 TISSUE EXAM BY PATHOLOGIST: CPT | Mod: TC | Performed by: DERMATOLOGY

## 2020-07-22 PROCEDURE — 11102 TANGNTL BX SKIN SINGLE LES: CPT | Performed by: DERMATOLOGY

## 2020-07-22 ASSESSMENT — PAIN SCALES - GENERAL: PAINLEVEL: NO PAIN (0)

## 2020-07-22 NOTE — NURSING NOTE
The following medication was given:     MEDICATION:  Lidocaine with epinephrine 1% 1:[id number]  ROUTE: SQ  SITE: see procedure note  DOSE: 1mL  LOT #: -EV  : Marisela  EXPIRATION DATE: 1/1/2021  NDC#: 3107-9109-01   Was there drug waste? 2mL  Multi-dose vial: Yes    Pham Chi RN  July 22, 2020

## 2020-07-22 NOTE — NURSING NOTE
Reanna Diallo's goals for this visit include:   Chief Complaint   Patient presents with     Derm Problem     Lesion on scalp found by hairdresser       She requests these members of her care team be copied on today's visit information: n/a    PCP: No primary care provider on file.    Referring Provider:  No referring provider defined for this encounter.    Pham Chi RN

## 2020-07-22 NOTE — PROGRESS NOTES
[facility] Dermatology Note    Dermatology Problem List:  0. NUBS - Left vertex scalp s/p biopsy 7/22/20  1. Acne vulgaris   - spironolactone 150 mg daily, clindamycin 1% gel  - s/p adapalene 0.3% gel, doxycycline, spironolactone 100 mg daily, Accutane course   2. Eczematous dermatitis, neck and left axilla   - triamcinolone 0.1% ointment  3. Hx folliculitis, legs  - BPO wash    CC:   Chief Complaint   Patient presents with     Derm Problem     Lesion on scalp found by hairdresser       Encounter Date: Jul 22, 2020    History of Present Illness:  Ms. Reanna Diallo is a 25 year old female who presents for evaluation of a brown spot on her scalp. It was first noticed by her stylist, but she isn't sure how long it has been there. It is asymptomatic.    No personal or family history of skin cancer.   No blistering sunburns, but several non-blistering. No tanning.     Past Medical History:   No significant past medical history.     Social History:  Patient reports that she has never smoked. She has never used smokeless tobacco.    Family History:  Family History   Problem Relation Age of Onset     Skin Cancer No family hx of      Melanoma No family hx of        Medications:  Current Outpatient Medications   Medication Sig Dispense Refill     norgestrel-ethinyl estradiol (LO/OVRAL) 0.3-30 MG-MCG per tablet Take 1 tablet by mouth daily       Allergies   Allergen Reactions     Amoxicillin Hives     Penicillin G Hives       Review of Systems:  -Constitutional: Otherwise feeling well today, in usual state of health.  -Skin: As above in HPI. No additional skin concerns.    Physical exam:  Vitals: There were no vitals taken for this visit.  GEN: This is a well developed, well-nourished female in no acute distress, in a pleasant mood.    SKIN: Focused examination of the scalp was performed.  -Ashley skin type: II  - Tan 0.8cm macule with scalloped border   -No other lesions of concern on areas  examined.     Impression/Plan:  1. Neoplasm of undetermined behavior, left vertex scalp  DDx: Lentigo, MIS, Junctional nevus.     Procedure Note: Biopsy by shave technique  The risks and benefits of the procedure were described to the patient. These include but are not limited to bleeding, infection, scar, incomplete removal, and non-diagnostic biopsy. Written informed consent was obtained. The left vertex scalp was cleansed with an alcohol pad and injected with 1% lidocaine with epinephrine buffered with sodium bicarbonate. Once anesthesia was obtained, a biopsy was performed with Gilette blade. The tissue was placed in a labeled container with formalin and sent to pathology. Hemostasis was achieved with aluminum chloride. Vaseline and a bandage were applied to the wound. The patient tolerated the procedure well and was given post biopsy care instructions.     Follow-up pending biopsy results.     Staff Involved:  Staff Only    I personally performed the procedures today.    Brandon Berg DO    Department of Dermatology  [facility]: Phone: [phone], Fax:[phone]  [facility]: Phone: [phone], Fax: [phone]

## 2020-07-22 NOTE — PATIENT INSTRUCTIONS

## 2020-07-25 LAB — COPATH REPORT: NORMAL

## 2020-07-29 ENCOUNTER — TELEPHONE (OUTPATIENT)
Dept: DERMATOLOGY | Facility: CLINIC | Age: 25
End: 2020-07-29

## 2020-07-29 NOTE — TELEPHONE ENCOUNTER
Brandon Wasserman MD  [facility] Dermatology Adult [place]               Please call the patient with pathology results.     The biopsy confirmed a benign sun spot called a lentigo. No skin cancer was present.   As long as the wound is healing well, no additional surgery is necessary.     She may follow up with Nicolette for acne on her acne in 6-12 months if well controlled, sooner if she has concerns.   Thank you.    Associated Results     Dermatological path order and indications   Order: [id number]   Status:  Final result   Visible to patient:  Yes (MyChart) Dx:  Neoplasm of uncertain behavior of skin   Component  7d ago   Copath Report  Patient Name: SUZAN CABRERA   MR#: [id number]   Specimen #: [id number]   Collected: 7/22/2020   Received: 7/23/2020   Reported: 7/25/2020 17:23   Ordering Phy(s): BRANDON WASSERMAN     For improved result formatting, select 'View Enhanced Report Format' under    Linked Documents section.     SPECIMEN(S):   Skin, left vertex scalp, shave     FINAL DIAGNOSIS:   Skin, left vertex scalp, shave:   - Elongate rete ridges and increased basal keratinocyte pigment,   consistent with lentigo - (see description)

## 2020-07-29 NOTE — TELEPHONE ENCOUNTER
[facility] Call Center    Phone Message    May a detailed message be left on voicemail: yes     Reason for Call: Other: Patient returning call regarding her biopsy results. She would like a call back to discuss.      Best number to reach her: [phone]    Action Taken: Message routed to:  Adult Clinics: Dermatology p [id number]    Travel Screening: Not Applicable

## 2020-07-29 NOTE — TELEPHONE ENCOUNTER
Pt left a  returning clinic's call. RN called pt back and notified pt of results. Pt verbalized understanding and states that she would like to schedule a skin check in about 3 months with Nicolette Quiles. Pt scheduled..Doreen Moore RN

## 2020-10-14 ENCOUNTER — OFFICE VISIT (OUTPATIENT)
Dept: DERMATOLOGY | Facility: CLINIC | Age: 25
End: 2020-10-14
Payer: COMMERCIAL

## 2020-10-14 DIAGNOSIS — L81.4 LENTIGINES: ICD-10-CM

## 2020-10-14 DIAGNOSIS — Z12.83 SKIN CANCER SCREENING: ICD-10-CM

## 2020-10-14 DIAGNOSIS — Z23 NEED FOR PROPHYLACTIC VACCINATION AND INOCULATION AGAINST INFLUENZA: Primary | ICD-10-CM

## 2020-10-14 PROCEDURE — 90471 IMMUNIZATION ADMIN: CPT | Performed by: PHYSICIAN ASSISTANT

## 2020-10-14 PROCEDURE — 99212 OFFICE O/P EST SF 10 MIN: CPT | Mod: 25 | Performed by: PHYSICIAN ASSISTANT

## 2020-10-14 PROCEDURE — 90686 IIV4 VACC NO PRSV 0.5 ML IM: CPT | Performed by: PHYSICIAN ASSISTANT

## 2020-10-14 ASSESSMENT — PAIN SCALES - GENERAL: PAINLEVEL: NO PAIN (0)

## 2020-10-14 NOTE — LETTER
10/14/2020         RE: Reanna Diallo  [address] [place] [address]        Dear Colleague,    Thank you for referring your patient, Reanna Diallo, to the [facility]. Please see a copy of my visit note below.    Reanna Diallo's goals for this visit include:   Chief Complaint   Patient presents with     Skin Check     No personal or family hx SC. Not immunosuppressed. No areas of concern.       She requests these members of her care team be copied on today's visit information: no    PCP: No Ref-Primary, Physician    Referring Provider:  No referring provider defined for this encounter.    There were no vitals taken for this visit.    Do you need any medication refills at today's visit? No  Sylwia Anglin LPN        [facility] Dermatology Note      Dermatology Problem List:  1. Acne vulgaris   - spironolactone 150 mg daily, clindamycin 1% gel  - s/p adapalene 0.3% gel, doxycycline, spironolactone 100 mg daily, Accutane course   2. Eczematous dermatitis, neck and left axilla   - triamcinolone 0.1% ointment  3. Hx folliculitis, legs  - BPO wash    CC:   Chief Complaint   Patient presents with     Skin Check     No personal or family hx SC. Not immunosuppressed. No areas of concern.     Encounter Date: Oct 14, 2020    History of Present Illness:  Ms. Reanna Diallo is a 25 year old female who presents for a FBSE. She was last seen in person by Dr. Berg for a biopsy on the forehead which was found to be a benign lentigo. She has not specific spots of concern today and would like a baseline skin exam. She does  use sunscreen, it is in her makeup, but due to the pandemic she has not been wearing it. She is otherwise well and has no other concerns.     Past Medical History:   There is no problem list on file for this patient.    No past medical history on file.  No past surgical history on file.    Social History:  Patient works for [employer].    Family  History:  Not obtained today.    Medications:  Current Outpatient Medications   Medication Sig Dispense Refill     norgestrel-ethinyl estradiol (LO/OVRAL) 0.3-30 MG-MCG per tablet Take 1 tablet by mouth daily       Allergies   Allergen Reactions     Amoxicillin Hives     Penicillin G Hives     Review of Systems:  -Constitutional: The patient denies fatigue, fevers, chills, unintended weight loss, and night sweats.  -HEENT: Patient denies nonhealing oral sores.  -Skin: As above in HPI. No additional skin concerns.    Physical exam:  Vitals: There were no vitals taken for this visit.  GEN: This is a well developed, well-nourished female in no acute distress, in a pleasant mood.    SKIN: Full skin, which includes the head/face, both arms, chest, back, abdomen,both legs, genitalia and/or groin buttocks, digits and/or nails, was examined.  -Scattered brown macules on bilateral helices  -bx site well healed on forehead  -Ashley's skin type II, less than 100 nevi  -No other lesions of concern on areas examined.     Impression/Plan:  1. Skin cancer screening  -ABCDs of melanoma were discussed and self skin checks were advised. , Sun precaution was advised including the use of sun screens of SPF 30 or higher, sun protective clothing, and avoidance of tanning beds.  -Continue with skin exams every few years    2. Solar lentigines  -discussed etiology, reassured benign  -Sunscreen: Apply 20 minutes prior to going outdoors and reapply every two hours, when wet or sweating. We recommend using an SPF 30 or higher, and to use one that is water resistant.      CC Dr. Berg on close of this encounter.  Follow-up in 2 years, earlier for new or changing lesions.       Staff Involved:  Staff Only  All risks, benefits and alternatives were discussed with patient.  Patient is in agreement and understands the assessment and plan.  All questions were answered.    Nicolette Quiles PA-C, MPAS  [facility]  [facility] [place]: Phone: [phone], Fax: [phone]  [facility]: Phone: [phone],  Fax: [phone]                Again, thank you for allowing me to participate in the care of your patient.        Sincerely,        Nicolette Quiles PA-C

## 2020-10-14 NOTE — PROGRESS NOTES
[facility] Dermatology Note      Dermatology Problem List:  1. Acne vulgaris   - spironolactone 150 mg daily, clindamycin 1% gel  - s/p adapalene 0.3% gel, doxycycline, spironolactone 100 mg daily, Accutane course   2. Eczematous dermatitis, neck and left axilla   - triamcinolone 0.1% ointment  3. Hx folliculitis, legs  - BPO wash    CC:   Chief Complaint   Patient presents with     Skin Check     No personal or family hx SC. Not immunosuppressed. No areas of concern.     Encounter Date: Oct 14, 2020    History of Present Illness:  Ms. Reanna Diallo is a 25 year old female who presents for a FBSE. She was last seen in person by Dr. Berg for a biopsy on the forehead which was found to be a benign lentigo. She has not specific spots of concern today and would like a baseline skin exam. She does  use sunscreen, it is in her makeup, but due to the pandemic she has not been wearing it. She is otherwise well and has no other concerns.     Past Medical History:   There is no problem list on file for this patient.    No past medical history on file.  No past surgical history on file.    Social History:  Patient works for [employer].    Family History:  Not obtained today.    Medications:  Current Outpatient Medications   Medication Sig Dispense Refill     norgestrel-ethinyl estradiol (LO/OVRAL) 0.3-30 MG-MCG per tablet Take 1 tablet by mouth daily       Allergies   Allergen Reactions     Amoxicillin Hives     Penicillin G Hives     Review of Systems:  -Constitutional: The patient denies fatigue, fevers, chills, unintended weight loss, and night sweats.  -HEENT: Patient denies nonhealing oral sores.  -Skin: As above in HPI. No additional skin concerns.    Physical exam:  Vitals: There were no vitals taken for this visit.  GEN: This is a well developed, well-nourished female in no acute distress, in a pleasant mood.    SKIN: Full skin, which includes the head/face, both arms, chest, back, abdomen,both  legs, genitalia and/or groin buttocks, digits and/or nails, was examined.  -Scattered brown macules on bilateral helices  -bx site well healed on forehead  -Ashley's skin type II, less than 100 nevi  -No other lesions of concern on areas examined.     Impression/Plan:  1. Skin cancer screening  -ABCDs of melanoma were discussed and self skin checks were advised. , Sun precaution was advised including the use of sun screens of SPF 30 or higher, sun protective clothing, and avoidance of tanning beds.  -Continue with skin exams every few years    2. Solar lentigines  -discussed etiology, reassured benign  -Sunscreen: Apply 20 minutes prior to going outdoors and reapply every two hours, when wet or sweating. We recommend using an SPF 30 or higher, and to use one that is water resistant.      CC Dr. Berg on close of this encounter.  Follow-up in 2 years, earlier for new or changing lesions.       Staff Involved:  Staff Only  All risks, benefits and alternatives were discussed with patient.  Patient is in agreement and understands the assessment and plan.  All questions were answered.    Nicolette Quiles PA-C, MPAS  [facility] [place]: Phone: [phone], Fax: [phone]  [facility]: Phone: [phone],  Fax: [phone]

## 2020-10-14 NOTE — PROGRESS NOTES
Reanna Diallo's goals for this visit include:   Chief Complaint   Patient presents with     Skin Check     No personal or family hx SC. Not immunosuppressed. No areas of concern.       She requests these members of her care team be copied on today's visit information: no    PCP: No Ref-Primary, Physician    Referring Provider:  No referring provider defined for this encounter.    There were no vitals taken for this visit.    Do you need any medication refills at today's visit? No  Sylwia Anglin LPN

## 2021-04-24 ENCOUNTER — HEALTH MAINTENANCE LETTER (OUTPATIENT)
Age: 26
End: 2021-04-24

## 2021-05-24 ENCOUNTER — NURSE TRIAGE (OUTPATIENT)
Dept: NURSING | Facility: CLINIC | Age: 26
End: 2021-05-24

## 2021-05-24 NOTE — TELEPHONE ENCOUNTER
Bug bite on ankle red , swollen and hard time moving ankle. Warm to the touch. No tick found. Right ankle.  Some on left ankle.    Poison ivy? Was [place] last week. Started out looking like bug bite. Blistered and oozing, swollen, itchy.    Needs to be seen.    Julee CALVO [facility] Nurse Advisor    Reason for Disposition    Red or very tender (to touch) area, and started over 24 hours after the sting    Additional Information    Negative: Passed out (i.e., fainted, collapsed and was not responding)    Negative: Wheezing or difficulty breathing    Negative: Hoarseness, cough, or tightness in the throat or chest    Negative: Swollen tongue or difficulty swallowing    Negative: Life-threatening reaction in past to sting (anaphylaxis) and < 2 hours since sting    Negative: Sounds like a life-threatening emergency to the triager    Negative: Not a bee, wasp, hornet, or yellow jacket sting    Negative: Hives, itching, or swelling elsewhere on body (i.e., not at a site of sting) and started within 2 hours of sting    Negative: Vomiting or abdominal cramps and started within 2 hours of sting    Negative: Gave epinephrine shot and no symptoms now    Negative: Patient sounds very sick or weak to the triager    Negative: Sting inside the mouth    Negative: Sting on eyeball (e.g., cornea)    Negative: More than 50 stings    Negative: Fever and area is red    Negative: Fever and area is very tender to touch    Negative: Red streak or red line and length > 2 inches (5 cm)    Protocols used: BEE OR YELLOW JACKET STING-A-OH

## 2021-05-24 NOTE — PROGRESS NOTES
"    Assessment & Plan     Cellulitis of foot  - H MEPILEXLITE DRESSING 4X4  - silver sulfADIAZINE (SILVADENE) 1 % external cream; Apply topically daily  - sulfamethoxazole-trimethoprim (BACTRIM DS) 800-160 MG tablet; Take 1 tablet by mouth 2 times daily for 7 days  - hydrOXYzine (ATARAX) 25 MG tablet; Take 1 tablet (25 mg) by mouth 3 times daily as needed for itching        Return in about 3 days (around 5/28/2021) for using a video visit RECHECK WOUND.    Jaleesa Saldaña MD  [facility] Forte is a 25 year old who presents for the following health issues  accompanied by her Self:    HPI   Patient with changes to the skin of her right ankle, less severe on left ankle after a Hiking trip to [place]. Patient denies any fall, trauma, fever or chills. Lesion  Started as a bug bite with a small puncture and then erythema, bullae formation with moderate pruritus.      Review of Systems   Constitutional, HEENT, cardiovascular, pulmonary, GI, , musculoskeletal, neuro, skin, endocrine and psych systems are negative, except as otherwise noted.      Objective    BP 92/66   Pulse 62   Temp 97.4  F (36.3  C) (Temporal)   Resp 18   Ht 1.676 m (5' 6\")   Wt 73.3 kg (161 lb 11.2 oz)   LMP  (LMP Unknown)   SpO2 99%   Breastfeeding No   BMI 26.10 kg/m    Body mass index is 26.1 kg/m .  Physical Exam   GENERAL: healthy, alert and no distress  EYES: Eyes grossly normal to inspection, PERRL and conjunctivae and sclerae normal  HENT: ear canals and TM's normal, nose and mouth without ulcers or lesions  NECK: no adenopathy, no asymmetry, masses, or scars and thyroid normal to palpation  RESP: lungs clear to auscultation - no rales, rhonchi or wheezes  CV: regular rate and rhythm, normal S1 S2, no S3 or S4, no murmur, click or rub, no peripheral edema and peripheral pulses strong  ABDOMEN: soft, nontender, no hepatosplenomegaly, no masses and bowel sounds normal  MS: no gross musculoskeletal " defects noted, no edema  SKIN: erythematous papules with bullae formation, pruritic.

## 2021-05-25 ENCOUNTER — OFFICE VISIT (OUTPATIENT)
Dept: FAMILY MEDICINE | Facility: CLINIC | Age: 26
End: 2021-05-25
Payer: COMMERCIAL

## 2021-05-25 VITALS
RESPIRATION RATE: 18 BRPM | SYSTOLIC BLOOD PRESSURE: 92 MMHG | HEART RATE: 62 BPM | BODY MASS INDEX: 25.99 KG/M2 | HEIGHT: 66 IN | OXYGEN SATURATION: 99 % | DIASTOLIC BLOOD PRESSURE: 66 MMHG | TEMPERATURE: 97.4 F | WEIGHT: 161.7 LBS

## 2021-05-25 DIAGNOSIS — L03.119 CELLULITIS OF FOOT: Primary | ICD-10-CM

## 2021-05-25 PROCEDURE — 99203 OFFICE O/P NEW LOW 30 MIN: CPT | Performed by: FAMILY MEDICINE

## 2021-05-25 RX ORDER — SULFAMETHOXAZOLE/TRIMETHOPRIM 800-160 MG
1 TABLET ORAL 2 TIMES DAILY
Qty: 14 TABLET | Refills: 0 | Status: SHIPPED | OUTPATIENT
Start: 2021-05-25 | End: 2021-06-01

## 2021-05-25 RX ORDER — HYDROXYZINE HYDROCHLORIDE 25 MG/1
25 TABLET, FILM COATED ORAL 3 TIMES DAILY PRN
Qty: 30 TABLET | Refills: 0 | Status: SHIPPED | OUTPATIENT
Start: 2021-05-25 | End: 2022-12-12

## 2021-05-25 RX ORDER — SILVER SULFADIAZINE 10 MG/G
CREAM TOPICAL DAILY
Qty: 50 G | Refills: 1 | Status: SHIPPED | OUTPATIENT
Start: 2021-05-25 | End: 2021-06-01

## 2021-05-25 ASSESSMENT — MIFFLIN-ST. JEOR: SCORE: 1500.22

## 2021-05-25 NOTE — PATIENT INSTRUCTIONS
Patient Education     Discharge Instructions for Cellulitis   You have been diagnosed with cellulitis. This is an infection in the deepest layer of the skin and tissue beneath the skin. In some cases, the infection also affects the muscle. Cellulitis is caused by bacteria. The bacteria can enter the body through broken skin. This can happen with a cut, scratch, animal bite, or an insect bite that has been scratched. You may have been treated in the hospital with antibiotics and fluids. You will likely be given a prescription for antibiotics to take at home. This sheet will help you take care of yourself at home.  Home care  When you are home:    Take the prescribed antibiotic medicine you are given as directed until it is gone. Take it even if you feel better. It treats the infection and stops it from returning. Not taking all the medicine can make future infections hard to treat.    Keep the infected area clean.    When possible, raise the infected area above the level of your heart. This helps keep swelling down.    Talk with your healthcare provider if you are in pain. Ask what kind of over-the-counter medicine you can take for pain.    Apply clean bandages as advised.    Take your temperature once a day for a week.    Wash your hands often to prevent spreading the infection.  In the future, wash your hands before and after you touch cuts, scratches, or bandages. This will help prevent infection.   When to call your healthcare provider  Call your healthcare provider right away if you have any of the following:    Trouble or pain when moving the joints above or below the infected area    Discharge or pus draining from the area    Fever of 100.4 F (38 C) or higher, or as directed by your healthcare provider    Pain that gets worse in or around the infected     Redness that gets worse in or around the infected area, particularly if the area of redness expands to a wider area    Shaking chills    Swelling of the  infected area    Vomiting  Darlyn last reviewed this educational content on 11/1/2019 2000-2021 [employer]. All rights reserved. This information is not intended as a substitute for professional medical care. Always follow your healthcare professional's instructions.

## 2021-05-26 NOTE — PROGRESS NOTES
Reanna is a 26 year old who is being evaluated via a billable video visit.      How would you like to obtain your AVS? MyChart  If the video visit is dropped, the invitation should be resent by: Text to cell phone: [phone]  Will anyone else be joining your video visit? No    Video Start Time: 7:10 AM    Assessment & Plan     Cellulitis of foot  Improving  Continue silvadene, bactrim oral, hydroxyzine ( per epic care) as needed for itching and routine wound care dressing.    20  minutes spent on the date of the encounter doing chart review, history and exam, documentation and further activities per the note    No follow-ups on file.    Jaleesa Saldaña MD  [facility]e is a 26 year old who presents for the following health issues  accompanied by her Self:    HPI   Patient here for follow up after multiple bites from an unknown organism causing cellulitis and bullae formation last week while hiking at [place]. She denies side effects of the medication, she has no pain, fever, lightheadedness, erythema and pruritus have improved.      Review of Systems   Constitutional, HEENT, cardiovascular, pulmonary, gi and gu systems are negative, except as otherwise noted.      Objective           Vitals:  No vitals were obtained today due to virtual visit.    Physical Exam  Musculoskeletal:        Feet:         GENERAL: Healthy, alert and no distress  EYES: Eyes grossly normal to inspection.  No discharge or erythema, or obvious scleral/conjunctival abnormalities.  RESP: No audible wheeze, cough, or visible cyanosis.  No visible retractions or increased work of breathing.    NEURO: Cranial nerves grossly intact.  Mentation and speech appropriate for age.  PSYCH: Mentation appears normal, affect normal/bright, judgement and insight intact, normal speech and appearance well-groomed.            Video-Visit Details    Type of service:  Video Visit    Video End Time:7:27 AM    Originating  Location (pt. Location): Home    Distant Location (provider location):  [facility]     Platform used for Video Visit: Tierney

## 2021-05-28 ENCOUNTER — VIRTUAL VISIT (OUTPATIENT)
Dept: FAMILY MEDICINE | Facility: CLINIC | Age: 26
End: 2021-05-28
Payer: COMMERCIAL

## 2021-05-28 DIAGNOSIS — L03.119 CELLULITIS OF FOOT: Primary | ICD-10-CM

## 2021-05-28 PROCEDURE — 99213 OFFICE O/P EST LOW 20 MIN: CPT | Mod: 95 | Performed by: FAMILY MEDICINE

## 2021-05-30 ENCOUNTER — NURSE TRIAGE (OUTPATIENT)
Dept: NURSING | Facility: CLINIC | Age: 26
End: 2021-05-30

## 2021-05-30 NOTE — TELEPHONE ENCOUNTER
"Reanna seen on Tuesday related to some kind of infected bite on right ankle from about a week previous. Prescribed bactrim, silver sulfa cream and antihistamine. Now thinks is getting what she describes as a \"hive like red bumpy rash.\" Rash located hands and feet. She is wondering if allergic reaction to meds or whether it is worsening of initially problem. Recommended she hold meds until seen again today. She verbalized understanding and plans to do this.     No fever, not dizzy, not short of breath.   "

## 2021-06-01 ENCOUNTER — OFFICE VISIT (OUTPATIENT)
Dept: URGENT CARE | Facility: URGENT CARE | Age: 26
End: 2021-06-01
Payer: COMMERCIAL

## 2021-06-01 VITALS
DIASTOLIC BLOOD PRESSURE: 80 MMHG | BODY MASS INDEX: 25.66 KG/M2 | OXYGEN SATURATION: 97 % | SYSTOLIC BLOOD PRESSURE: 137 MMHG | HEART RATE: 76 BPM | WEIGHT: 159 LBS | TEMPERATURE: 97.7 F

## 2021-06-01 DIAGNOSIS — T50.905A ADVERSE EFFECT OF DRUG, INITIAL ENCOUNTER: Primary | ICD-10-CM
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
## 2021-06-01 PROCEDURE — 99213 OFFICE O/P EST LOW 20 MIN: CPT | Performed by: NURSE PRACTITIONER

## 2021-06-01 NOTE — PROGRESS NOTES
"    Assessment & Plan     Adverse effect of drug, initial encounter  Doing much better after starting hydoxyzine. Still has swelling in the fingers, but is getting better. Did not want ring on right middle finger cut off. Discussed when to return to get this cut off if needed.  Allegra daily for 7 days with hydroxyzine at bedtime.   Allergy list updated to include sulfa     Bug bite, subsequent encounter  Resolved. Sulfa meds stopped 3 days ago and area is all dried up.       20 minutes spent on the date of the encounter doing chart review, patient visit and documentation        BMI:   Estimated body mass index is 25.66 kg/m  as calculated from the following:    Height as of 5/25/21: 1.676 m (5' 6\").    Weight as of this encounter: 72.1 kg (159 lb).       Patient Instructions   Take Allegra once a day and use the Hydoxyzine at bedtime for the next 7 days  For the middle right finger with the ring on, try soaking hand in an ice bath and gently try to get the ring off. May use olive oil for a lubricant.  If the finger starts to discolor or increase in swelling, you will need to get it cut off.        Return in about 1 week (around 6/8/2021), or if symptoms worsen or fail to improve.    Lucero Boudreaux, CNP  [facility] Forte is a 26 year old who presents for the following health issues     HPI     Concern - allergic reaction to medication and bug bite  Onset: 4 days ago  Description: was being treated for possible cellulitis with Bactrim and Silvadene cream.   Intensity: started having rash and low energy 4 days ago. Called nurse line and was told to stop her antibiotic and cream. Since then her symptoms have improved especially when she started hydroxyzine.   Progression of Symptoms:  improving  Accompanying Signs & Symptoms: still has some swelling in her fingers but is getting better. Has a ring on her right middle finger she can not get off. Does not want it cut off. "   Did take benadryl when this first started.       Review of Systems   Constitutional, HEENT, cardiovascular, pulmonary, gi and gu systems are negative, except as otherwise noted.      Objective    /80   Pulse 76   Temp 97.7  F (36.5  C) (Tympanic)   Wt 72.1 kg (159 lb)   LMP  (LMP Unknown)   SpO2 97%   BMI 25.66 kg/m    Body mass index is 25.66 kg/m .  Physical Exam   GENERAL: healthy, alert and no distress  EYES: Eyes grossly normal to inspection, PERRL and conjunctivae and sclerae normal  MS: no gross musculoskeletal defects noted, no edema  SKIN:

## 2021-06-01 NOTE — PATIENT INSTRUCTIONS
Take Allegra once a day and use the Hydoxyzine at bedtime for the next 7 days  For the middle right finger with the ring on, try soaking hand in an ice bath and gently try to get the ring off. May use olive oil for a lubricant.  If the finger starts to discolor or increase in swelling, you will need to get it cut off.

## 2021-10-09 ENCOUNTER — HEALTH MAINTENANCE LETTER (OUTPATIENT)
Age: 26
End: 2021-10-09

## 2022-09-01 ENCOUNTER — OFFICE VISIT (OUTPATIENT)
Dept: URGENT CARE | Facility: URGENT CARE | Age: 27
End: 2022-09-01
Payer: COMMERCIAL

## 2022-09-01 VITALS
SYSTOLIC BLOOD PRESSURE: 121 MMHG | DIASTOLIC BLOOD PRESSURE: 72 MMHG | WEIGHT: 160.1 LBS | RESPIRATION RATE: 16 BRPM | TEMPERATURE: 98.7 F | HEART RATE: 78 BPM | BODY MASS INDEX: 25.84 KG/M2 | OXYGEN SATURATION: 99 %

## 2022-09-01 DIAGNOSIS — J06.9 UPPER RESPIRATORY TRACT INFECTION, UNSPECIFIED TYPE: ICD-10-CM

## 2022-09-01 DIAGNOSIS — J02.9 SORE THROAT: Primary | ICD-10-CM

## 2022-09-01 LAB — DEPRECATED S PYO AG THROAT QL EIA: NEGATIVE

## 2022-09-01 PROCEDURE — 87651 STREP A DNA AMP PROBE: CPT | Performed by: PHYSICIAN ASSISTANT

## 2022-09-01 PROCEDURE — U0005 INFEC AGEN DETEC AMPLI PROBE: HCPCS | Performed by: PHYSICIAN ASSISTANT

## 2022-09-01 PROCEDURE — 99213 OFFICE O/P EST LOW 20 MIN: CPT | Mod: CS | Performed by: PHYSICIAN ASSISTANT

## 2022-09-01 PROCEDURE — U0003 INFECTIOUS AGENT DETECTION BY NUCLEIC ACID (DNA OR RNA); SEVERE ACUTE RESPIRATORY SYNDROME CORONAVIRUS 2 (SARS-COV-2) (CORONAVIRUS DISEASE [COVID-19]), AMPLIFIED PROBE TECHNIQUE, MAKING USE OF HIGH THROUGHPUT TECHNOLOGIES AS DESCRIBED BY CMS-2020-01-R: HCPCS | Performed by: PHYSICIAN ASSISTANT

## 2022-09-01 ASSESSMENT — ENCOUNTER SYMPTOMS
MYALGIAS: 1
HEADACHES: 0
RHINORRHEA: 0
APPETITE CHANGE: 0
COUGH: 1
SORE THROAT: 1
FEVER: 1

## 2022-09-01 NOTE — PROGRESS NOTES
SUBJECTIVE:   Reanna Diallo is a 27 year old female presenting with a chief complaint of   Chief Complaint   Patient presents with     Pharyngitis       She is an established patient of [facility].  Patient presents with ST x 3 days.  Some cough dry.  Negative covid test x 2.  Tmax 101.      Treatment:  Ibuprofen - 3:30 last; niquil.      Review of Systems   Constitutional: Positive for fever. Negative for appetite change.   HENT: Positive for sore throat. Negative for congestion, ear pain and rhinorrhea.    Respiratory: Positive for cough.    Musculoskeletal: Positive for myalgias.   Skin: Negative for rash.   Neurological: Negative for headaches.   All other systems reviewed and are negative.      No past medical history on file.  Family History   Problem Relation Age of Onset     Skin Cancer No family hx of      Melanoma No family hx of      Current Outpatient Medications   Medication Sig Dispense Refill     hydrOXYzine (ATARAX) 25 MG tablet Take 1 tablet (25 mg) by mouth 3 times daily as needed for itching 30 tablet 0     norgestrel-ethinyl estradiol (LO/OVRAL) 0.3-30 MG-MCG per tablet Take 1 tablet by mouth daily       Social History     Tobacco Use     Smoking status: Never Smoker     Smokeless tobacco: Never Used   Substance Use Topics     Alcohol use: Yes     Comment: OCC -       OBJECTIVE  /72 (BP Location: Right arm, Patient Position: Chair, Cuff Size: Adult Regular)   Pulse 78   Temp 98.7  F (37.1  C) (Oral)   Resp 16   Wt 72.6 kg (160 lb 1.6 oz)   SpO2 99%   Breastfeeding No   BMI 25.84 kg/m      Physical Exam  Vitals and nursing note reviewed.   Constitutional:       Appearance: Normal appearance. She is normal weight.   HENT:      Head: Normocephalic and atraumatic.      Right Ear: Tympanic membrane, ear canal and external ear normal.      Left Ear: Tympanic membrane, ear canal and external ear normal.      Nose: Nose normal.      Mouth/Throat:      Mouth: Mucous membranes are moist.       Pharynx: Oropharynx is clear. Posterior oropharyngeal erythema present.   Eyes:      Extraocular Movements: Extraocular movements intact.      Conjunctiva/sclera: Conjunctivae normal.   Cardiovascular:      Rate and Rhythm: Normal rate and regular rhythm.      Pulses: Normal pulses.      Heart sounds: Normal heart sounds.   Pulmonary:      Effort: Pulmonary effort is normal.      Breath sounds: Normal breath sounds.   Musculoskeletal:      Cervical back: Normal range of motion.   Skin:     General: Skin is warm and dry.      Findings: No rash.   Neurological:      General: No focal deficit present.      Mental Status: She is alert.   Psychiatric:         Mood and Affect: Mood normal.         Behavior: Behavior normal.         Labs:  Results for orders placed or performed in visit on 09/01/22 (from the past 24 hour(s))   Streptococcus A Rapid Screen w/Reflex to PCR - Clinic Collect    Specimen: Throat; Swab   Result Value Ref Range    Group A Strep antigen Negative Negative       X-Ray was not done.    ASSESSMENT:      ICD-10-CM    1. Sore throat  J02.9 Streptococcus A Rapid Screen w/Reflex to PCR - Clinic Collect     Symptomatic; Unknown COVID-19 Virus (Coronavirus) by PCR Nose     Streptococcus A Rapid Screen w/Reflex to PCR - Clinic Collect     Group A Streptococcus PCR Throat Swab        Medical Decision Making:    Differential Diagnosis:  URI Adult/Peds:  Strep pharyngitis, Viral pharyngitis and Viral upper respiratory illness    Serious Comorbid Conditions:  Adult:  reviewed    PLAN:    Tylenol/motrin as needed.  Drink plenty of water.  Gargle with salt water.  May use chloraseptic spray as directed for ST.  Strep pcr pending.  Discussed and recommended CDC guidelines for self isolation.  COVID test pending.      Followup:    If not improving or if condition worsens, follow up with your Primary Care Provider, If not improving or if conditions worsens over the next 12-24 hours, go to the Emergency  Department    There are no Patient Instructions on file for this visit.

## 2022-09-02 LAB — GROUP A STREP BY PCR: NOT DETECTED

## 2022-09-03 ENCOUNTER — TELEPHONE (OUTPATIENT)
Dept: NURSING | Facility: CLINIC | Age: 27
End: 2022-09-03

## 2022-09-03 LAB — SARS-COV-2 RNA RESP QL NAA+PROBE: POSITIVE

## 2022-09-03 NOTE — TELEPHONE ENCOUNTER
Patient classified as COVID treatment eligible by Epic high risk algorithm:  No    Coronavirus (COVID-19) Notification    Reason for call  Notify of POSITIVE COVID-19 lab result, assess symptoms,  review [facility] recommendations    Lab Result   Lab test for 2019-nCoV rRt-PCR or SARS-COV-2 PCR  Oropharyngeal AND/OR nasopharyngeal swabs were POSITIVE for 2019-nCoV RNA [OR] SARS-COV-2 RNA (COVID-19) RNA     We have been unable to reach patient by phone at this time to notify of their Positive COVID-19 result.    Left voicemail message requesting a call back to [phone] [facility] for results.        A Positive COVID-19 letter will be sent via [employer] or the mail.    Fransisca Delgado

## 2022-09-11 ENCOUNTER — HEALTH MAINTENANCE LETTER (OUTPATIENT)
Age: 27
End: 2022-09-11

## 2022-12-09 NOTE — PROGRESS NOTES
Reanna is a 27 year old No obstetric history on file. female who presents for annual exam.     Besides routine health maintenance, she has no other health concerns today .    HPI: here for annual exam.  She is due for a pap.  She is at end of her cycle today.  She started OCP's at age 17 for acne and was on acutane for awhile.  She has not been on acutane for last 4-5 years, but stayed on the OCP's.  She has never been sexually active and is thinking she may stop OCP's for awhile.  Wants a prescription sent tho in case she decides to stay on OCP's.  Declined STD testing.    The patient's PCP is  Physician No Ref-Primary.        GYNECOLOGIC HISTORY:    Patient's last menstrual period was 12/08/2022.    Regular menses? yes  Menses every 28-30 days.  Length of menses: 5-6 days    Her current contraception method is: oral contraceptives.  She  reports that she has never smoked. She has never used smokeless tobacco.    Patient is not sexually active.  STD testing offered?  Declined  Last PHQ-9 score on record = No flowsheet data found.  Last GAD7 score on record = No flowsheet data found.  Alcohol Score = 0    HEALTH MAINTENANCE:  Cholesterol: (  Cholesterol   Date Value Ref Range Status   08/08/2012 142 <200 mg/dL Final      Last Mammo: Not applicable, Result: Not applicable, Next Mammo: Due at age 41   Pap: (No results found for: GYNINTERP, PAP )    Colonoscopy:  N/A, Result: Not applicable, Next Colonoscopy: age 46   Dexa:  N/A    Health maintenance updated:  yes    HISTORY:  OB History   No obstetric history on file.       Patient Active Problem List   Diagnosis     Acute Sinusitis     History reviewed. No pertinent surgical history.   Social History     Tobacco Use     Smoking status: Never     Smokeless tobacco: Never   Substance Use Topics     Alcohol use: Yes     Comment: OCC -         Negative family history of: Skin Cancer, Melanoma            Current Outpatient Medications   Medication Sig      "drospirenone-ethinyl estradiol (MC) 3-0.03 MG tablet Take 1 tablet by mouth daily     norgestrel-ethinyl estradiol (LO/OVRAL) 0.3-30 MG-MCG tablet Take 1 tablet by mouth daily     No current facility-administered medications for this visit.     Allergies   Allergen Reactions     Sulfa Drugs Hives     Amoxicillin Hives     Penicillin G Hives       Past medical, surgical, social and family histories were reviewed and updated in EPIC.    ROS:   12 point review of systems negative other than symptoms noted below or in the HPI.  Normal menstrual cycles    EXAM:  /68   Ht 1.676 m (5' 6\")   Wt 74.7 kg (164 lb 9.6 oz)   LMP 12/08/2022   BMI 26.57 kg/m     BMI: Body mass index is 26.57 kg/m .    PHYSICAL EXAM:  Constitutional:   Appearance: Well nourished, well developed, alert, in no acute distress  Neck:  Lymph Nodes:  No lymphadenopathy present    Thyroid:  Gland size normal, nontender, no nodules or masses present  on palpation  Chest:  Respiratory Effort:  Breathing unlabored  Cardiovascular:    Heart: Auscultation:  Regular rate, normal rhythm, no murmurs present  Breasts: Inspection of Breasts:  No lymphadenopathy present., Palpation of Breasts and Axillae:  No masses present on palpation, no breast tenderness., Axillary Lymph Nodes:  No lymphadenopathy present. and No nodularity, asymmetry or nipple discharge bilaterally.  Gastrointestinal:   Abdominal Examination:  Abdomen nontender to palpation, tone normal without rigidity or guarding, no masses present, umbilicus without lesions   Liver and Spleen:  No hepatomegaly present, liver nontender to palpation    Hernias:  No hernias present  Lymphatic: Lymph Nodes:  No other lymphadenopathy present  Skin:  General Inspection:  No rashes present, no lesions present, no areas of  discoloration  Neurologic:    Mental Status:  Oriented X3.  Normal strength and tone, sensory exam                grossly normal, mentation intact and speech normal.    Psychiatric: "   Mentation appears normal and affect normal/bright.         Pelvic Exam:  External Genitalia:     Normal appearance for age, no discharge present, no tenderness present, no inflammatory lesions present, color normal  Vagina:     Normal vaginal vault without central or paravaginal defects, no discharge present, no inflammatory lesions present, no masses present  Bladder:     Nontender to palpation  Urethra:   Urethral Body:  Urethra palpation normal, urethra structural support normal   Urethral Meatus:  No erythema or lesions present  Cervix:     Appearance healthy, no lesions present, nontender to palpation, menustral bleeding present  Uterus:     Uterus: firm, normal sized and nontender, anteverted in position.   Adnexa:     No adnexal tenderness present, no adnexal masses present  Perineum:     Perineum within normal limits, no evidence of trauma, no rashes or skin lesions present  Anus:     Anus within normal limits, no hemorrhoids present  Inguinal Lymph Nodes:     No lymphadenopathy present  Pubic Hair:     Normal pubic hair distribution for age  Genitalia and Groin:     No rashes present, no lesions present, no areas of discoloration, no masses present      COUNSELING:   Reviewed preventive health counseling, as reflected in patient instructions       Regular exercise       Healthy diet/nutrition       Contraception       Safe sex practices/STD prevention    BMI: Body mass index is 26.57 kg/m .      ASSESSMENT:  27 year old female with satisfactory annual exam.    ICD-10-CM    1. Encntr for gyn exam (general) (routine) w/o abn findings  Z01.419 Pap thin layer screen reflex to HPV if ASCUS - recommended age 25 - 29 years     norgestrel-ethinyl estradiol (LO/OVRAL) 0.3-30 MG-MCG tablet      2. Encounter for surveillance of contraceptive pills  Z30.41           PLAN:  Normal gyn exam.  Ok to continue OCP's for 1 year.  Return prn or 1 year for annual.  Pap every 3 years if normal.    Rosmery Amin, MEHRAN  CNP

## 2022-12-12 ENCOUNTER — OFFICE VISIT (OUTPATIENT)
Dept: OBGYN | Facility: CLINIC | Age: 27
End: 2022-12-12
Payer: COMMERCIAL

## 2022-12-12 VITALS
SYSTOLIC BLOOD PRESSURE: 122 MMHG | HEIGHT: 66 IN | DIASTOLIC BLOOD PRESSURE: 68 MMHG | BODY MASS INDEX: 26.45 KG/M2 | WEIGHT: 164.6 LBS

## 2022-12-12 DIAGNOSIS — Z01.419 ENCNTR FOR GYN EXAM (GENERAL) (ROUTINE) W/O ABN FINDINGS: Primary | ICD-10-CM

## 2022-12-12 DIAGNOSIS — Z30.41 ENCOUNTER FOR SURVEILLANCE OF CONTRACEPTIVE PILLS: ICD-10-CM

## 2022-12-12 PROCEDURE — G0145 SCR C/V CYTO,THINLAYER,RESCR: HCPCS | Performed by: NURSE PRACTITIONER

## 2022-12-12 PROCEDURE — 99385 PREV VISIT NEW AGE 18-39: CPT | Performed by: NURSE PRACTITIONER

## 2022-12-12 RX ORDER — DROSPIRENONE AND ETHINYL ESTRADIOL 0.03MG-3MG
1 KIT ORAL DAILY
COMMUNITY
Start: 2022-10-13

## 2022-12-12 ASSESSMENT — ANXIETY QUESTIONNAIRES
2. NOT BEING ABLE TO STOP OR CONTROL WORRYING: SEVERAL DAYS
6. BECOMING EASILY ANNOYED OR IRRITABLE: NOT AT ALL
5. BEING SO RESTLESS THAT IT IS HARD TO SIT STILL: SEVERAL DAYS
7. FEELING AFRAID AS IF SOMETHING AWFUL MIGHT HAPPEN: NOT AT ALL
GAD7 TOTAL SCORE: 5
1. FEELING NERVOUS, ANXIOUS, OR ON EDGE: SEVERAL DAYS
3. WORRYING TOO MUCH ABOUT DIFFERENT THINGS: SEVERAL DAYS
GAD7 TOTAL SCORE: 5
IF YOU CHECKED OFF ANY PROBLEMS ON THIS QUESTIONNAIRE, HOW DIFFICULT HAVE THESE PROBLEMS MADE IT FOR YOU TO DO YOUR WORK, TAKE CARE OF THINGS AT HOME, OR GET ALONG WITH OTHER PEOPLE: SOMEWHAT DIFFICULT

## 2022-12-12 ASSESSMENT — PATIENT HEALTH QUESTIONNAIRE - PHQ9
5. POOR APPETITE OR OVEREATING: SEVERAL DAYS
SUM OF ALL RESPONSES TO PHQ QUESTIONS 1-9: 5

## 2022-12-15 LAB
BKR LAB AP GYN ADEQUACY: NORMAL
BKR LAB AP GYN INTERPRETATION: NORMAL
BKR LAB AP HPV REFLEX: NORMAL
BKR LAB AP PREVIOUS ABNORMAL: NORMAL
PATH REPORT.COMMENTS IMP SPEC: NORMAL
PATH REPORT.COMMENTS IMP SPEC: NORMAL
PATH REPORT.RELEVANT HX SPEC: NORMAL

## 2023-01-09 ENCOUNTER — OFFICE VISIT (OUTPATIENT)
Dept: DERMATOLOGY | Facility: CLINIC | Age: 28
End: 2023-01-09
Payer: COMMERCIAL

## 2023-01-09 DIAGNOSIS — L70.0 ACNE VULGARIS: ICD-10-CM

## 2023-01-09 DIAGNOSIS — D22.9 MULTIPLE BENIGN NEVI: Primary | ICD-10-CM

## 2023-01-09 DIAGNOSIS — L57.8 ACTINIC SKIN DAMAGE: ICD-10-CM

## 2023-01-09 PROCEDURE — 99203 OFFICE O/P NEW LOW 30 MIN: CPT | Performed by: STUDENT IN AN ORGANIZED HEALTH CARE EDUCATION/TRAINING PROGRAM

## 2023-01-09 ASSESSMENT — PAIN SCALES - GENERAL: PAINLEVEL: NO PAIN (0)

## 2023-01-09 NOTE — PROGRESS NOTES
[facility] Dermatology Note    Encounter Date: Jan 9, 2023    Dermatology Problem List:  1. Acne vulgaris   - spironolactone 150 mg daily, clindamycin 1% gel  - s/p adapalene 0.3% gel, doxycycline, spironolactone 100 mg daily, Accutane course   2. Eczematous dermatitis, neck and left axilla   - triamcinolone 0.1% ointment  3. Hx folliculitis, legs  - BPO wash    Major PMHx  -   ______________________________________    Impression/Plan:  Reanna was seen today for skin check.    Diagnoses and all orders for this visit:    Multiple benign nevi  Actinic skin damage  - discussed sunprotective behaviors, clothing, and the use of sunscreen    Acne vulgaris  - discussed rx treatment, declines    Follow-up PRN.       Staff Involved:  Staff Only    Erasmo Bateman MD   of Dermatology  Department of Dermatology  [facility]      CC:   Chief Complaint   Patient presents with     Skin Check       History of Present Illness:  Ms. Reanna Diallo is a 27 year old female who presents as a return patient.    Here for examination of brown spots     Labs:      Physical exam:  Vitals: LMP 12/08/2022   Breastfeeding No   GEN: well developed, well-nourished, in no acute distress, in a pleasant mood.     SKIN: Ashley phototype 1  - Full skin, which includes the head/face, both arms, chest, back, abdomen,both legs, genitalia and/or groin buttocks, digits and/or nails, was examined.  - scattered brown papules on trunk and extremities   - Flat brown macules and patches in a sun exposed areas on face and extremities  - pink papules and pustules on face  - No other lesions of concern on areas examined.     Past Medical History:   History reviewed. No pertinent past medical history.  No past surgical history on file.    Social History:   reports that she has never smoked. She has never used smokeless tobacco. She reports current alcohol use. She reports that she does  not use drugs.    Family History:  Family History   Problem Relation Age of Onset     Skin Cancer No family hx of      Melanoma No family hx of        Medications:  Current Outpatient Medications   Medication Sig Dispense Refill     drospirenone-ethinyl estradiol (MC) 3-0.03 MG tablet Take 1 tablet by mouth daily       norgestrel-ethinyl estradiol (LO/OVRAL) 0.3-30 MG-MCG tablet Take 1 tablet by mouth daily 84 tablet 1     Allergies   Allergen Reactions     Sulfa Drugs Hives     Amoxicillin Hives     Penicillin G Hives

## 2023-01-09 NOTE — LETTER
1/9/2023         RE: Reanna Diallo  [address] [place] [address]        Dear Colleague,    Thank you for referring your patient, Reanna Diallo, to the [facility]. Please see a copy of my visit note below.    [facility] Dermatology Note    Encounter Date: Jan 9, 2023    Dermatology Problem List:  1. Acne vulgaris   - spironolactone 150 mg daily, clindamycin 1% gel  - s/p adapalene 0.3% gel, doxycycline, spironolactone 100 mg daily, Accutane course   2. Eczematous dermatitis, neck and left axilla   - triamcinolone 0.1% ointment  3. Hx folliculitis, legs  - BPO wash    Major PMHx  -   ______________________________________    Impression/Plan:  Reanna was seen today for skin check.    Diagnoses and all orders for this visit:    Multiple benign nevi  Actinic skin damage  - discussed sunprotective behaviors, clothing, and the use of sunscreen    Acne vulgaris  - discussed rx treatment, declines    Follow-up PRN.       Staff Involved:  Staff Only    Erasmo Bateman MD   of Dermatology  Department of Dermatology  [facility]      CC:   Chief Complaint   Patient presents with     Skin Check       History of Present Illness:  Ms. Reanna Diallo is a 27 year old female who presents as a return patient.    Here for examination of brown spots     Labs:      Physical exam:  Vitals: LMP 12/08/2022   Breastfeeding No   GEN: well developed, well-nourished, in no acute distress, in a pleasant mood.     SKIN: Ashley phototype 1  - Full skin, which includes the head/face, both arms, chest, back, abdomen,both legs, genitalia and/or groin buttocks, digits and/or nails, was examined.  - scattered brown papules on trunk and extremities   - Flat brown macules and patches in a sun exposed areas on face and extremities  - pink papules and pustules on face  - No other lesions of concern on areas examined.      Past Medical History:   History reviewed. No pertinent past medical history.  No past surgical history on file.    Social History:   reports that she has never smoked. She has never used smokeless tobacco. She reports current alcohol use. She reports that she does not use drugs.    Family History:  Family History   Problem Relation Age of Onset     Skin Cancer No family hx of      Melanoma No family hx of        Medications:  Current Outpatient Medications   Medication Sig Dispense Refill     drospirenone-ethinyl estradiol (MC) 3-0.03 MG tablet Take 1 tablet by mouth daily       norgestrel-ethinyl estradiol (LO/OVRAL) 0.3-30 MG-MCG tablet Take 1 tablet by mouth daily 84 tablet 1     Allergies   Allergen Reactions     Sulfa Drugs Hives     Amoxicillin Hives     Penicillin G Hives                   Again, thank you for allowing me to participate in the care of your patient.        Sincerely,        Erasmo Bateman MD

## 2023-07-21 ENCOUNTER — OFFICE VISIT (OUTPATIENT)
Dept: URGENT CARE | Facility: URGENT CARE | Age: 28
End: 2023-07-21
Payer: COMMERCIAL

## 2023-07-21 VITALS
SYSTOLIC BLOOD PRESSURE: 124 MMHG | HEART RATE: 66 BPM | OXYGEN SATURATION: 98 % | DIASTOLIC BLOOD PRESSURE: 72 MMHG | TEMPERATURE: 98.3 F

## 2023-07-21 DIAGNOSIS — S60.121A CONTUSION OF RIGHT INDEX FINGER WITH DAMAGE TO NAIL, INITIAL ENCOUNTER: Primary | ICD-10-CM

## 2023-07-21 PROCEDURE — 99207 PR NO CHARGE LOS: CPT | Mod: F6

## 2023-07-21 NOTE — PROGRESS NOTES
Assessment & Plan     Contusion of right index finger with damage to nail, initial encounter    No charge for visit today.  Recommend BANDAID to RIGHT finger to cover torn nail and allow it to grow out and trim as necessary so as to avoid further damage to nailbed and prevent nail dystrophy from trauma.    Dulce Maria Silveira MD   Urgent Care Provider  [facility] RODRIGUE Forte is a 28 year old, presenting for the following health issues:  Urgent Care (Pt reports 'ripped off right index fingernail' X3 weeks ago.)    HPI     RIGHT index finger nail was ripped off while waterskiing 3 weeks ago when nail got caught in tow rope.  Nail is hanging off attached down at very base-- patient wanting me to cut it off today.        Review of Systems   Constitutional, HEENT, cardiovascular, pulmonary, GI, , musculoskeletal, neuro, skin, endocrine and psych systems are negative, except as otherwise noted.      Objective    /72   Pulse 66   Temp 98.3  F (36.8  C) (Oral)   SpO2 98%   There is no height or weight on file to calculate BMI.  Physical Exam

## 2024-02-24 ENCOUNTER — HEALTH MAINTENANCE LETTER (OUTPATIENT)
Age: 29
End: 2024-02-24

## 2025-03-09 ENCOUNTER — HEALTH MAINTENANCE LETTER (OUTPATIENT)
Age: 30
End: 2025-03-09